# Patient Record
Sex: MALE
[De-identification: names, ages, dates, MRNs, and addresses within clinical notes are randomized per-mention and may not be internally consistent; named-entity substitution may affect disease eponyms.]

---

## 2018-09-25 ENCOUNTER — HOSPITAL ENCOUNTER (EMERGENCY)
Dept: HOSPITAL 14 - H.ER | Age: 43
LOS: 1 days | Discharge: HOME | End: 2018-09-26
Payer: COMMERCIAL

## 2018-09-25 DIAGNOSIS — N20.1: Primary | ICD-10-CM

## 2018-09-25 LAB
BASOPHILS # BLD AUTO: 0 K/UL (ref 0–0.2)
BASOPHILS NFR BLD: 0.3 % (ref 0–2)
BUN SERPL-MCNC: 15 MG/DL (ref 9–20)
CALCIUM SERPL-MCNC: 10 MG/DL (ref 8.4–10.2)
EOSINOPHIL # BLD AUTO: 0 K/UL (ref 0–0.7)
EOSINOPHIL NFR BLD: 0 % (ref 0–4)
ERYTHROCYTE [DISTWIDTH] IN BLOOD BY AUTOMATED COUNT: 13.1 % (ref 11.5–14.5)
GFR NON-AFRICAN AMERICAN: 51
HGB BLD-MCNC: 15.4 G/DL (ref 12–18)
LYMPHOCYTES # BLD AUTO: 1 K/UL (ref 1–4.3)
LYMPHOCYTES NFR BLD AUTO: 6.4 % (ref 20–40)
MCH RBC QN AUTO: 28.6 PG (ref 27–31)
MCHC RBC AUTO-ENTMCNC: 34.8 G/DL (ref 33–37)
MCV RBC AUTO: 82.3 FL (ref 80–94)
MONOCYTES # BLD: 0.4 K/UL (ref 0–0.8)
MONOCYTES NFR BLD: 2.9 % (ref 0–10)
NEUTROPHILS # BLD: 13.6 K/UL (ref 1.8–7)
NEUTROPHILS NFR BLD AUTO: 90.4 % (ref 50–75)
NRBC BLD AUTO-RTO: 0 % (ref 0–0)
PLATELET # BLD: 249 K/UL (ref 130–400)
PMV BLD AUTO: 7.7 FL (ref 7.2–11.7)
RBC # BLD AUTO: 5.39 MIL/UL (ref 4.4–5.9)
WBC # BLD AUTO: 15 K/UL (ref 4.8–10.8)

## 2018-09-25 PROCEDURE — 85025 COMPLETE CBC W/AUTO DIFF WBC: CPT

## 2018-09-25 PROCEDURE — 80048 BASIC METABOLIC PNL TOTAL CA: CPT

## 2018-09-25 PROCEDURE — 99284 EMERGENCY DEPT VISIT MOD MDM: CPT

## 2018-09-25 PROCEDURE — 81003 URINALYSIS AUTO W/O SCOPE: CPT

## 2018-09-25 PROCEDURE — 96375 TX/PRO/DX INJ NEW DRUG ADDON: CPT

## 2018-09-25 PROCEDURE — 96374 THER/PROPH/DIAG INJ IV PUSH: CPT

## 2018-09-25 PROCEDURE — 74176 CT ABD & PELVIS W/O CONTRAST: CPT

## 2018-09-25 NOTE — ED PDOC
HPI: Abdomen


Time Seen by Provider: 18 22:19


Chief Complaint (Nursing): Abdominal Pain


History Per: Patient


History/Exam Limitations: no limitations


Onset/Duration Of Symptoms: Hrs


Outside of [place] travel?: No


Current Symptoms Are (Timing): Still Present


Location Of Pain/Discomfort: LLQ


Quality Of Discomfort: Dull


Associated Symptoms: Nausea, Vomiting.  denies: Fever, Chills


Additional Complaint(s): 





Hx of prior kidney stone presenting with LLQ pain x 1 day, states that he 

noticed the pain around 12PM, has been relatively constant, associated with 

nausea and vomiting, initially clear but progressed to yellow/brown, approx 12 

episodes.  Normal BM this AM.  No fevers, chills.  States he has urinary 

hesitancy as well, denies dysuria.  No back pain/flank pain.





PMD: None





Past Medical History


Reviewed: Historical Data, Nursing Documentation, Vital Signs


Vital Signs: 


                                Last Vital Signs











Temp  97.9 F   18 02:00


 


Pulse  62   18 02:00


 


Resp  16   18 02:00


 


BP  108/64   18 02:00


 


Pulse Ox  100   18 02:00














- Medical History


PMH: No Chronic Diseases





- Surgical History


Surgical History: No Surg Hx





- Family History


Family History: States: No Known Family Hx





- Home Medications


Home Medications: 


                                Ambulatory Orders











 Medication  Instructions  Recorded


 


Tamsulosin [Flomax] 0.4 mg PO DAILY #10 cap 16


 


traMADol [Ultram] 50 mg PO Q6 PRN #16 tab 16


 


Acetaminophen/Hydrocodone Bi 1 tab PO Q8 #12 tab 18





[Vicodin 300 mg-5 mg]  


 


Cefuroxime Axetil [Cefuroxime] 500 mg PO BID 5 Days #10 tablet 18


 


RX: Ibuprofen [Motrin Tab] 600 mg PO Q6 #30 tab 18


 


RX: Tamsulosin [Flomax] 0.4 mg PO BID 7 Days #14 cap 18














- Allergies


Allergies/Adverse Reactions: 


                                    Allergies











Allergy/AdvReac Type Severity Reaction Status Date / Time


 


No Known Allergies Allergy   Verified 18 22:08














Review of Systems


ROS Statement: Except As Marked, All Systems Reviewed And Found Negative


Gastrointestinal: Positive for: Nausea, Vomiting, Abdominal Pain





Physical Exam





- Reviewed


Nursing Documentation Reviewed: Yes


Vital Signs Reviewed: Yes





- Physical Exam


Appears: Positive for: Well, Non-toxic, No Acute Distress


Head Exam: Positive for: ATRAUMATIC, NORMAL INSPECTION, NORMOCEPHALIC


Skin: Positive for: Normal Color, Warm, DRY


Eye Exam: Positive for: EOMI, Normal appearance, PERRL


ENT: Positive for: Normal ENT Inspection


Neck: Positive for: Normal, Painless ROM


Cardiovascular/Chest: Positive for: Regular Rate, Rhythm


Respiratory: Positive for: CNT, Normal Breath Sounds


Gastrointestinal/Abdominal: Positive for: Normal Exam, Bowel Sounds, Soft, 

Tenderness (LLQ tenderness to palpation).  Negative for: Organomegaly, Mass, 

Distended, Guarding, Rebound, Hernia


Male Genital Exam: Positive for: normal genitalia, no hernia.  Negative for: 

epididymal tenderness, testicular tenderness (L)


Back: Positive for: Normal Inspection


Extremity: Positive for: Normal ROM


Neurologic/Psych: Positive for: Alert, Oriented





- Laboratory Results


Result Diagrams: 


                                 18 23:10





                                 18 23:10





- ECG


O2 Sat by Pulse Oximetry: 100


Pulse Ox Interpretation: Normal





Medical Decision Making


Medical Decision MakinPM


Patient presenting with LLQ pain, nausea, vomiting


--Currently well appearing, stable vitals


--DDx includes but no limited to: renal colic, UTI, pyelonephritis, 

diverticulitis, colitis, gastroenteritis


--Will get labs, CT, urine, toradol, fluids, zofran


--Will reeval





0100


--Patient feeling much better, no pain


--Advised of results


--Patient states he has an appointment with a urologist in [facility]


--Advised caution when taking narcotics because of addictive potential


--Patient discharged in well appearing, stable condition





Disposition





- Clinical Impression


Clinical Impression: 


 Ureteral calculus








- Disposition


Referrals: 


[facility] [Outside]


Disposition: Routine/Home


Disposition Time: 01:00


Condition: STABLE


Prescriptions: 


Acetaminophen/Hydrocodone Bi [Vicodin 300 mg-5 mg] 1 tab PO Q8 #12 tab


Cefuroxime Axetil [Cefuroxime] 500 mg PO BID 5 Days #10 tablet


RX: Ibuprofen [Motrin Tab] 600 mg PO Q6 #30 tab


RX: Tamsulosin [Flomax] 0.4 mg PO BID 7 Days #14 cap


Instructions:  Kidney Stones in Adults, Taking Narcotics Safely, How to Give 

Naloxone


Forms:  [email or web address] (English)

## 2018-09-26 VITALS
DIASTOLIC BLOOD PRESSURE: 64 MMHG | TEMPERATURE: 97.9 F | HEART RATE: 62 BPM | SYSTOLIC BLOOD PRESSURE: 108 MMHG | OXYGEN SATURATION: 100 % | RESPIRATION RATE: 16 BRPM

## 2018-09-26 LAB
BACTERIA #/AREA URNS HPF: (no result) /[HPF]
BILIRUB UR-MCNC: NEGATIVE MG/DL
COLOR UR: (no result)
GLUCOSE UR STRIP-MCNC: (no result) MG/DL
LEUKOCYTE ESTERASE UR-ACNC: (no result) LEU/UL
LYMPHOCYTE: 6 % (ref 20–50)
MONOCYTE: 2 % (ref 0–10)
NEUTROPHILS NFR BLD AUTO: 92 % (ref 42–75)
PH UR STRIP: 5 [PH] (ref 5–8)
PLATELET # BLD EST: NORMAL 10*3/UL
PROT UR STRIP-MCNC: 100 MG/DL
RBC # UR STRIP: NEGATIVE /UL
RBC MORPH BLD: NORMAL
SP GR UR STRIP: 1.04 (ref 1–1.03)
TOTAL CELLS COUNTED BLD: 100
URINE CLARITY: (no result)
UROBILINOGEN UR-MCNC: 2 MG/DL (ref 0.2–1)

## 2018-09-26 NOTE — CT
Date of service: 



09/25/2018



PROCEDURE:  CT Abdomen and Pelvis without intravenous contrast



HISTORY:

LLQ pain, nausea, vomiting, hx of renal stone



COMPARISON:

CT scan of the abdomen and pelvis dated 05/19/2016.



TECHNIQUE:

Contiguous images were obtained from the domes of the diaphragms to 

the upper thighs without the administration of intravenous contrast. 

Oral contrast was not administered.



Radiation dose:



Total exam DLP = 873.5 mGy-cm.



This CT exam was performed using one or more of the following dose 

reduction techniques: Automated exposure control, adjustment of the 

mA and/or kV according to patient size, and/or use of iterative 

reconstruction technique.



FINDINGS:



LOWER THORAX:

Unremarkable. 



LIVER:

Unremarkable. No gross lesion or ductal dilatation.  



GALLBLADDER AND BILE DUCTS:

Unremarkable. 



PANCREAS:

Unremarkable. No gross lesion or ductal dilatation.



SPLEEN:

Unremarkable. 



ADRENALS:

Unremarkable. No mass. 



KIDNEYS AND URETERS:

Punctate nonobstructive right interpolar and lower pole calculi.  

Nonobstructive left lower pole punctate calculus.  1 x 1 x 2 mm 

calculus in the urinary bladder just beyond the left ureterovesicular 

junction with mild hydroureteronephrosis and left perinephric 

stranding. No solid mass. 



VASCULATURE:

Unremarkable. No aortic aneurysm. 



BOWEL:

Interposition of colon anterior to the liver.  No obstruction. No 

gross mural thickening. 



APPENDIX:

Unremarkable. Normal appendix. 



PERITONEUM:

Left larger than right small bilateral fat containing inguinal 

hernias.  No free fluid. No free air. 



LYMPH NODES:

Unremarkable. No enlarged lymph nodes. 



BLADDER:

Unremarkable. 



REPRODUCTIVE:

Unremarkable. 



BONES:

No acute fracture. 



OTHER FINDINGS:

None.



IMPRESSION:

1 x 1 x 2 mm calculus just beyond the left ureterovesicular junction 

in the urinary bladder causing mild hydroureteronephrosis.



Additional nonobstructive punctate bilateral nephrolithiasis.



Additional stable findings as above.

## 2018-12-31 ENCOUNTER — HOSPITAL ENCOUNTER (EMERGENCY)
Dept: HOSPITAL 14 - H.ER | Age: 43
Discharge: HOME | End: 2018-12-31
Payer: COMMERCIAL

## 2018-12-31 VITALS
TEMPERATURE: 98.2 F | OXYGEN SATURATION: 100 % | RESPIRATION RATE: 17 BRPM | SYSTOLIC BLOOD PRESSURE: 131 MMHG | DIASTOLIC BLOOD PRESSURE: 85 MMHG | HEART RATE: 55 BPM

## 2018-12-31 VITALS — BODY MASS INDEX: 34.4 KG/M2

## 2018-12-31 DIAGNOSIS — Z79.899: ICD-10-CM

## 2018-12-31 DIAGNOSIS — N13.2: Primary | ICD-10-CM

## 2018-12-31 DIAGNOSIS — Z87.442: ICD-10-CM

## 2018-12-31 LAB
ALBUMIN SERPL-MCNC: 4.5 G/DL (ref 3.5–5)
ALBUMIN/GLOB SERPL: 1.5 {RATIO} (ref 1–2.1)
ALT SERPL-CCNC: 37 U/L (ref 21–72)
ANISOCYTOSIS BLD QL SMEAR: SLIGHT
AST SERPL-CCNC: 22 U/L (ref 17–59)
BASOPHILS # BLD AUTO: 0 K/UL (ref 0–0.2)
BASOPHILS NFR BLD: 0.2 % (ref 0–2)
BASOPHILS NFR BLD: 1 % (ref 0–2)
BILIRUB UR-MCNC: NEGATIVE MG/DL
BUN SERPL-MCNC: 15 MG/DL (ref 9–20)
CALCIUM SERPL-MCNC: 9.8 MG/DL (ref 8.4–10.2)
COLOR UR: YELLOW
EOSINOPHIL # BLD AUTO: 0 K/UL (ref 0–0.7)
EOSINOPHIL NFR BLD: 0 % (ref 0–4)
ERYTHROCYTE [DISTWIDTH] IN BLOOD BY AUTOMATED COUNT: 13.5 % (ref 11.5–14.5)
GFR NON-AFRICAN AMERICAN: 51
GLUCOSE UR STRIP-MCNC: (no result) MG/DL
HGB BLD-MCNC: 15.7 G/DL (ref 12–18)
LEUKOCYTE ESTERASE UR-ACNC: (no result) LEU/UL
LG PLATELETS BLD QL SMEAR: PRESENT
LIPASE SERPL-CCNC: 64 U/L (ref 23–300)
LYMPHOCYTE: 6 % (ref 20–50)
LYMPHOCYTES # BLD AUTO: 0.8 K/UL (ref 1–4.3)
LYMPHOCYTES NFR BLD AUTO: 5.4 % (ref 20–40)
MCH RBC QN AUTO: 28 PG (ref 27–31)
MCHC RBC AUTO-ENTMCNC: 33.6 G/DL (ref 33–37)
MCV RBC AUTO: 83.5 FL (ref 80–94)
MONOCYTE: 2 % (ref 0–10)
MONOCYTES # BLD: 0.2 K/UL (ref 0–0.8)
MONOCYTES NFR BLD: 1.6 % (ref 0–10)
NEUTROPHILS # BLD: 14 K/UL (ref 1.8–7)
NEUTROPHILS NFR BLD AUTO: 91 % (ref 42–75)
NEUTROPHILS NFR BLD AUTO: 92.8 % (ref 50–75)
NRBC BLD AUTO-RTO: 0 % (ref 0–0)
OVALOCYTES BLD QL SMEAR: SLIGHT
PH UR STRIP: 7 [PH] (ref 5–8)
PLATELET # BLD EST: NORMAL 10*3/UL
PLATELET # BLD: 259 K/UL (ref 130–400)
PMV BLD AUTO: 7.7 FL (ref 7.2–11.7)
PROT UR STRIP-MCNC: 30 MG/DL
RBC # BLD AUTO: 5.59 MIL/UL (ref 4.4–5.9)
RBC # UR STRIP: NEGATIVE /UL
SP GR UR STRIP: 1.03 (ref 1–1.03)
TEARDROP CELLS: SLIGHT
TOTAL CELLS COUNTED BLD: 100
URINE CLARITY: (no result)
UROBILINOGEN UR-MCNC: 0.2 MG/DL (ref 0.2–1)
WBC # BLD AUTO: 15.1 K/UL (ref 4.8–10.8)

## 2018-12-31 PROCEDURE — 83690 ASSAY OF LIPASE: CPT

## 2018-12-31 PROCEDURE — 96374 THER/PROPH/DIAG INJ IV PUSH: CPT

## 2018-12-31 PROCEDURE — 96376 TX/PRO/DX INJ SAME DRUG ADON: CPT

## 2018-12-31 PROCEDURE — 87086 URINE CULTURE/COLONY COUNT: CPT

## 2018-12-31 PROCEDURE — 74176 CT ABD & PELVIS W/O CONTRAST: CPT

## 2018-12-31 PROCEDURE — 81003 URINALYSIS AUTO W/O SCOPE: CPT

## 2018-12-31 PROCEDURE — 85025 COMPLETE CBC W/AUTO DIFF WBC: CPT

## 2018-12-31 PROCEDURE — 80053 COMPREHEN METABOLIC PANEL: CPT

## 2018-12-31 PROCEDURE — 96375 TX/PRO/DX INJ NEW DRUG ADDON: CPT

## 2018-12-31 PROCEDURE — 99283 EMERGENCY DEPT VISIT LOW MDM: CPT

## 2018-12-31 NOTE — ED PDOC
HPI: Back


Time Seen by Provider: 12/31/18 14:07


Chief Complaint (Nursing): Back Pain


Chief Complaint (Provider): Flank pain and vomiting


History Per: Patient


History/Exam Limitations: no limitations


Onset/Duration Of Symptoms: Hrs


Current Symptoms Are (Timing): Still Present


Quality Of Discomfort: "Pain"


Additional Complaint(s): 


43 year old male with a history of kidney stone presents to the ED for an 

evaluation of flank pain and vomiting onset today. He took hydrocort prior to 

arrival but the patient vomited. Denies fever, chills, cough, abdominal pain or 

rash. 


PMD: unknown provider  











Past Medical History


Reviewed: Historical Data, Nursing Documentation, Vital Signs


Vital Signs: 


                                Last Vital Signs











Temp  98.2 F   12/31/18 14:02


 


Pulse  55 L  12/31/18 14:02


 


Resp  17   12/31/18 14:02


 


BP  131/85   12/31/18 14:02


 


Pulse Ox  100   12/31/18 14:02














- Medical History


PMH: Kidney Stones





- Family History


Family History: States: Unknown Family Hx





- Social History


Current smoker - smoking cessation education provided: No


Alcohol: Social


Drugs: Denies





- Home Medications


Home Medications: 


                                Ambulatory Orders











 Medication  Instructions  Recorded


 


Tamsulosin [Flomax] 0.4 mg PO DAILY #10 cap 05/19/16


 


traMADol [Ultram] 50 mg PO Q6 PRN #16 tab 05/19/16


 


Acetaminophen/Hydrocodone Bi 1 tab PO Q8 #12 tab 09/26/18





[Vicodin 300 mg-5 mg]  


 


Cefuroxime Axetil [Cefuroxime] 500 mg PO BID 5 Days #10 tablet 09/26/18


 


Ibuprofen [Motrin Tab] 600 mg PO Q6 #30 tab 09/26/18


 


Tamsulosin [Flomax] 0.4 mg PO BID 7 Days #14 cap 09/26/18


 


Ciprofloxacin HCl [Cipro] 500 mg PO BID #14 tablet 12/31/18


 


Naproxen 375 mg PO Q8 PRN #21 tablet 12/31/18


 


Ondansetron ODT [Zofran ODT] 4 mg PO Q8 PRN #10 odt 12/31/18


 


Oxycodone HCl/Acetaminophen 1 each PO Q6 PRN #10 tablet 12/31/18





[Oxycodone-Acetaminophen 5-325]  


 


Tamsulosin HCl [Flomax] 0.4 mg PO DAILY #10 cap.er.24h 12/31/18














- Allergies


Allergies/Adverse Reactions: 


                                    Allergies











Allergy/AdvReac Type Severity Reaction Status Date / Time


 


No Known Allergies Allergy   Verified 12/31/18 14:13














Review of Systems


ROS Statement: Except As Marked, All Systems Reviewed And Found Negative


Constitutional: Negative for: Fever, Chills


Gastrointestinal: Positive for: Vomiting.  Negative for: Abdominal Pain


Musculoskeletal: Positive for: Back Pain


Skin: Negative for: Rash


Neurological: Negative for: Weakness, Numbness





Physical Exam





- Reviewed


Nursing Documentation Reviewed: Yes


Vital Signs Reviewed: Yes





- Physical Exam


Appears: Positive for: Non-toxic, No Acute Distress


Head Exam: Positive for: ATRAUMATIC, NORMAL INSPECTION, NORMOCEPHALIC


Skin: Positive for: Normal Color, Warm, Dry.  Negative for: Rash


Eye Exam: Positive for: Normal appearance


Back: Negative for: Normal Inspection (right flank pain)


Neurologic/Psych: Positive for: Alert, Oriented (x3).  Negative for: 

Motor/Sensory Deficits





- Laboratory Results


Result Diagrams: 


                                 12/31/18 14:34





                                 12/31/18 14:34





- ECG


O2 Sat by Pulse Oximetry: 100 (RA)


Pulse Ox Interpretation: Normal





- Progress


ED Course And Treament: 





CT ABD/PELVIS:





IMPRESSION:


There is a small approximately 2.9 mm obstructing calculus distal right ureter 

with mild right-sided hydronephrosis. Punctate nonobstructing calcification 

lower pole collecting system right kidney. 





Small hiatal hernia with slight wall thickening of the distal esophagus likely 

due to protrusion gastric mucosa. Rule out esophagitis.





TORADOL 15 MG IV X 1 DOSE


ZOFRAN 4 MG IV X 1 DOSE





NS 1 LITER WIDE OPEN X 2 LITERS





PATIENT TOOK FLOMAX EARLIER TODAY.





Medical Decision Making


Medical Decision Making: 


Time:  1412


Initial impression: Right flank pain 


Initial plan:


ABD & Pelvis w/o PO or IV Contrast CT


CMP


CBC w/ Differential 


Normal Saline 1000 mls/hr 


Toradol 15mg


Zofran Injection 4mg 


Reevaluation 





--------------------------------

-------------------------------------------------------


Scribe Attestation:


Documented by Fish Go, acting as a scribe for Susan Mariano PA-C.


Provider Scribe Attestation:


All medical record entries made by the Scribe were at my direction and 

personally dictated by me. I have reviewed the chart and agree that the record 

accurately reflects my personal performance of the history, physical exam, 

medical decision making, and the department course for this patient. I have also

 personally directed, reviewed, and agree with the discharge instructions and 

disposition.











Disposition





- Clinical Impression


Clinical Impression: 


 Kidney stone on right side








- Patient ED Disposition


Is Patient to be Admitted: No





- Disposition


Referrals: 


Trever Reyes MD [Staff Provider] - 


Disposition: Routine/Home


Disposition Time: 18:50


Condition: FAIR


Prescriptions: 


Ciprofloxacin HCl [Cipro] 500 mg PO BID #14 tablet


Naproxen 375 mg PO Q8 PRN #21 tablet


 PRN Reason: Pain, Moderate (4-7)


Ondansetron ODT [Zofran ODT] 4 mg PO Q8 PRN #10 odt


 PRN Reason: Nausea/Vomiting


Oxycodone HCl/Acetaminophen [Oxycodone-Acetaminophen 5-325] 1 each PO Q6 PRN #10

tablet


 PRN Reason: Fever >100.4 F


Tamsulosin HCl [Flomax] 0.4 mg PO DAILY #10 cap.er.24h


Instructions:  Kidney Stones in Adults


Forms:  [facility] ED School/Work Excuse

## 2018-12-31 NOTE — CT
Date of service: 



12/31/2018



PROCEDURE:  CT Abdomen and Pelvis without intravenous contrast



HISTORY:

Renal colic right flank



COMPARISON:

Comparison made with prior CT scan of the abdomen pelvis 09/25/2018.



TECHNIQUE:

Technique. 



Contrast dose: 



Radiation dose:



Total exam DLP = 904.49 mGy-cm.



This CT exam was performed using one or more of the following dose 

reduction techniques: Automated exposure control, adjustment of the 

mA and/or kV according to patient size, and/or use of iterative 

reconstruction technique.



FINDINGS:



LOWER THORAX:

Heart size is within range of normal. No significant pericardial 

effusion to significant.



There is a small hiatal hernia with wall thickening of the distal 

esophagus likely due to protrusion of gastric mucosa however the 

possibility of esophagitis not excluded. Clinical correlation 

recommended. 



Lung bases clear. 



LIVER:

Liver is upper limits of normal measuring just over 18 cm in CC 

dimension. Liver exhibits relatively normal attenuation pattern 

without masses collections or calcifications. 



The 



GALLBLADDER AND BILE DUCTS:

Gallbladder physiologically distended. No evidence of intraluminal 

gallbladder calculi. 



PANCREAS:

No pancreatic masses collections or calcifications. No significant 

pancreatic ductal dilatation...



SPLEEN:

Spleen exhibits normal size and attenuation pattern. Suspect small 

splenule in the splenic hilar region. 



ADRENALS:

No adrenal lesions. 



KIDNEYS AND URETERS:

There is a punctate calcification seen in the midpole collecting 

system right kidney. Small approximately 2.9 mm calculus noted in the 

distal right ureter just above the right UVJ with mild right-sided 

hydronephrosis. Minimal infiltration changes also seen in the 

perinephric fat and in the fat adjacent to the renal pelvis and most 

of the proximal ureter. 



No evidence of left-sided nephrolithiasis or hydronephrosis 



VASCULATURE:

Unremarkable. No aortic aneurysm. No aortic atherosclerotic 

calcification or mural plaque present.



BOWEL:

Evaluation of the bowel is somewhat limited due to the lack of oral 

contrast material. The stomach is incompletely distended. Visualized 

loops of small bowel exhibit normal contour and caliber. No evidence 

of acute mechanical small bowel obstruction. 



APPENDIX:

Normal-appearing appendix.. 



PERITONEUM:

Unremarkable. No free fluid. No free air. Tiny fat containing 

umbilical hernia. Small bilateral fat containing inguinal hernias are 

present. 



LYMPH NODES:

Few small nonspecific retroperitoneal lymph nodes. 



BLADDER:

Urinary bladder is incompletely distended which in part accounts for 

thick-walled appearance. Muscular hypertrophy may contribute. Rule 

out cystitis. 



REPRODUCTIVE:

Prostate gland unremarkable.. 



BONES:

Mild multilevel degenerative spondylosis of the lower thoracic and 

lumbar spine. No acute compression fractures nor retropulsed 

fragments. 



OTHER FINDINGS:

None.



IMPRESSION:

There is a small approximately 2.9 mm obstructing calculus distal 

right ureter with mild right-sided hydronephrosis. Punctate 

nonobstructing calcification lower pole collecting system right 

kidney. 



Small hiatal hernia with slight wall thickening of the distal 

esophagus likely due to protrusion gastric mucosa. Rule out 

esophagitis.